# Patient Record
Sex: FEMALE | Race: WHITE | URBAN - METROPOLITAN AREA
[De-identification: names, ages, dates, MRNs, and addresses within clinical notes are randomized per-mention and may not be internally consistent; named-entity substitution may affect disease eponyms.]

---

## 2020-02-20 ENCOUNTER — OFFICE VISIT (OUTPATIENT)
Dept: URBAN - METROPOLITAN AREA CLINIC 52 | Facility: CLINIC | Age: 75
End: 2020-02-20
Payer: MEDICARE

## 2020-02-20 PROCEDURE — 99212 OFFICE O/P EST SF 10 MIN: CPT | Performed by: OPHTHALMOLOGY

## 2020-02-20 RX ORDER — LATANOPROST 50 UG/ML
0.005 % SOLUTION OPHTHALMIC
Qty: 3 | Refills: 3 | Status: INACTIVE
Start: 2020-02-20 | End: 2021-03-18

## 2020-02-20 RX ORDER — DORZOLAMIDE HYDROCHLORIDE AND TIMOLOL MALEATE 20; 5 MG/ML; MG/ML
SOLUTION/ DROPS OPHTHALMIC
Qty: 3 | Refills: 3 | Status: INACTIVE
Start: 2020-02-20 | End: 2021-06-28

## 2020-02-20 ASSESSMENT — INTRAOCULAR PRESSURE
OD: 16
OS: 14
OS: 16

## 2020-02-20 ASSESSMENT — KERATOMETRY
OS: 43.00
OD: 42.50

## 2020-02-20 NOTE — IMPRESSION/PLAN
Impression: Primary open-angle glaucoma, bilateral, mild stage: H40.1131. Plan: Continue Latanoprost QHS OU and Dorzolamide-Timolol BID OU. IOP adequate for nerves.

## 2020-02-20 NOTE — IMPRESSION/PLAN
Impression: Dislocated anterior lens of left eye: H27.122. Plan: Doing well, stable. Monitor for any changes.

## 2020-09-09 ENCOUNTER — TESTING ONLY (OUTPATIENT)
Dept: URBAN - METROPOLITAN AREA CLINIC 52 | Facility: CLINIC | Age: 75
End: 2020-09-09
Payer: MEDICARE

## 2020-09-09 PROCEDURE — 92083 EXTENDED VISUAL FIELD XM: CPT | Performed by: OPHTHALMOLOGY

## 2020-09-11 ENCOUNTER — OFFICE VISIT (OUTPATIENT)
Dept: URBAN - METROPOLITAN AREA CLINIC 52 | Facility: CLINIC | Age: 75
End: 2020-09-11
Payer: MEDICARE

## 2020-09-11 DIAGNOSIS — H40.1131 PRIMARY OPEN-ANGLE GLAUCOMA, BILATERAL, MILD STAGE: Primary | ICD-10-CM

## 2020-09-11 DIAGNOSIS — H27.122: ICD-10-CM

## 2020-09-11 PROCEDURE — 99213 OFFICE O/P EST LOW 20 MIN: CPT | Performed by: OPHTHALMOLOGY

## 2020-09-11 ASSESSMENT — INTRAOCULAR PRESSURE
OS: 15
OD: 16
OS: 16
OD: 17

## 2020-09-11 NOTE — IMPRESSION/PLAN
Impression: Primary open-angle glaucoma, bilateral, mild stage: H40.1131. CCT: 580/583    TMAX: 28/26 Plan: IOP doing well on Latanoprost OU QHS and Dorzolamide/Tiimolol OU BID - continue on current regimen. VF reviewed with patient - stable from previous. Will continue to monitor closely. Patient to call us sooner, onset of new visual symptoms.

## 2020-09-11 NOTE — IMPRESSION/PLAN
Impression: Dislocated anterior lens of left eye: H27.122. Plan: S/P IOL repositioning 10 years ago. Doing well, stable. Monitor for any changes.

## 2021-03-18 ENCOUNTER — OFFICE VISIT (OUTPATIENT)
Dept: URBAN - METROPOLITAN AREA CLINIC 52 | Facility: CLINIC | Age: 76
End: 2021-03-18
Payer: MEDICARE

## 2021-03-18 PROCEDURE — 99214 OFFICE O/P EST MOD 30 MIN: CPT | Performed by: OPHTHALMOLOGY

## 2021-03-18 ASSESSMENT — INTRAOCULAR PRESSURE
OD: 15
OD: 17
OS: 15

## 2021-03-18 NOTE — IMPRESSION/PLAN
Impression: Primary open-angle glaucoma, bilateral, mild stage: H40.1131. CCT: 580/583    TMAX: 28/26 Plan: IOP doing well on Latanoprost OU QHS and Dorzolamide/Tiimolol OU BID - due to HVF and low IOP will D/C Latanoprost and recheck IOP to verify stability, continue on current regimen w/ Cosopt. Will continue to monitor closely. Patient to call us sooner, onset of new visual symptoms.

## 2021-06-28 ENCOUNTER — OFFICE VISIT (OUTPATIENT)
Dept: URBAN - METROPOLITAN AREA CLINIC 52 | Facility: CLINIC | Age: 76
End: 2021-06-28
Payer: MEDICARE

## 2021-06-28 PROCEDURE — 99213 OFFICE O/P EST LOW 20 MIN: CPT | Performed by: OPHTHALMOLOGY

## 2021-06-28 RX ORDER — DORZOLAMIDE HYDROCHLORIDE AND TIMOLOL MALEATE 20; 5 MG/ML; MG/ML
SOLUTION/ DROPS OPHTHALMIC
Qty: 15 | Refills: 3 | Status: ACTIVE
Start: 2021-06-28

## 2021-06-28 ASSESSMENT — INTRAOCULAR PRESSURE
OD: 20
OS: 19
OS: 18

## 2021-06-28 NOTE — IMPRESSION/PLAN
Impression: Primary open-angle glaucoma, bilateral, mild stage: H40.1131. CCT: 580/583    TMAX: 28/26 Plan: IOP adequate for nerves on Dorzolamide/Timolol OU BID - continue on current regimen. Will have patient come back for VF 24-2 in September for continued close monitoring. Stressed the importance of using the eye drops the morning / night before her appointment. Patient to call us sooner, onset of new visual changes.

## 2021-09-08 ENCOUNTER — TESTING ONLY (OUTPATIENT)
Dept: URBAN - METROPOLITAN AREA CLINIC 52 | Facility: CLINIC | Age: 76
End: 2021-09-08
Payer: MEDICARE

## 2021-09-08 PROCEDURE — 92083 EXTENDED VISUAL FIELD XM: CPT | Performed by: OPHTHALMOLOGY

## 2021-09-16 ENCOUNTER — OFFICE VISIT (OUTPATIENT)
Dept: URBAN - METROPOLITAN AREA CLINIC 52 | Facility: CLINIC | Age: 76
End: 2021-09-16
Payer: MEDICARE

## 2021-09-16 DIAGNOSIS — H52.4 PRESBYOPIA: ICD-10-CM

## 2021-09-16 PROCEDURE — 92012 INTRM OPH EXAM EST PATIENT: CPT | Performed by: OPHTHALMOLOGY

## 2021-09-16 PROCEDURE — 92083 EXTENDED VISUAL FIELD XM: CPT | Performed by: OPHTHALMOLOGY

## 2021-09-16 ASSESSMENT — INTRAOCULAR PRESSURE
OD: 16
OD: 19
OS: 16
OS: 17

## 2021-09-16 NOTE — IMPRESSION/PLAN
Impression: Primary open-angle glaucoma, bilateral, mild stage: H40.1131. CCT: 580/583    TMAX: 28/26 Plan: IOP adequate for nerves on Dorzolamide/Timolol OU BID - continue on current regimen. Reviewed VF with patient that was done 2 weeks ago. Stressed the importance of using the eye drops the morning / night before her appointment. Patient to call us sooner, onset of new visual changes.

## 2021-10-15 ENCOUNTER — OFFICE VISIT (OUTPATIENT)
Dept: URBAN - METROPOLITAN AREA CLINIC 52 | Facility: CLINIC | Age: 76
End: 2021-10-15
Payer: COMMERCIAL

## 2021-10-15 PROCEDURE — 92004 COMPRE OPH EXAM NEW PT 1/>: CPT | Performed by: OPTOMETRIST

## 2021-10-15 ASSESSMENT — KERATOMETRY
OS: 42.75
OD: 42.38

## 2021-10-15 ASSESSMENT — INTRAOCULAR PRESSURE
OS: 16
OD: 17

## 2021-10-15 ASSESSMENT — VISUAL ACUITY
OS: 20/25
OD: 20/25

## 2021-10-15 NOTE — IMPRESSION/PLAN
Impression: Presbyopia: H52.4. Plan: Discussed need for reading glasses/add power, educated patient on bifocal, trifocal, and progressive options and expected adaptation period. Updated and released SRx today. Patient to use caution during daily activities until adaptation occurs. Continue to monitor annually.

## 2021-10-15 NOTE — IMPRESSION/PLAN
Impression: Dislocated anterior lens of left eye: H27.122. Plan: Monitor, patient already had replacement of lens by Dr. Jolynn Fu in [place].

## 2021-10-15 NOTE — IMPRESSION/PLAN
Impression: Primary open-angle glaucoma, bilateral, mild stage: H40.1131. Plan: Cont Cosopt BID OU. IOP holding. Monitor Q6mos.